# Patient Record
Sex: FEMALE | Race: WHITE | Employment: OTHER | URBAN - METROPOLITAN AREA
[De-identification: names, ages, dates, MRNs, and addresses within clinical notes are randomized per-mention and may not be internally consistent; named-entity substitution may affect disease eponyms.]

---

## 2017-07-01 PROCEDURE — 82043 UR ALBUMIN QUANTITATIVE: CPT | Performed by: FAMILY MEDICINE

## 2017-07-01 PROCEDURE — 82570 ASSAY OF URINE CREATININE: CPT | Performed by: FAMILY MEDICINE

## 2017-07-01 PROCEDURE — 82607 VITAMIN B-12: CPT | Performed by: FAMILY MEDICINE

## 2017-07-13 PROBLEM — M81.0 OSTEOPOROSIS, UNSPECIFIED OSTEOPOROSIS TYPE, UNSPECIFIED PATHOLOGICAL FRACTURE PRESENCE: Status: ACTIVE | Noted: 2017-07-13

## 2018-02-08 PROCEDURE — 82607 VITAMIN B-12: CPT | Performed by: FAMILY MEDICINE

## 2018-06-06 PROBLEM — M81.0 OSTEOPOROSIS: Status: ACTIVE | Noted: 2017-07-13

## 2018-08-14 ENCOUNTER — LAB ENCOUNTER (OUTPATIENT)
Dept: LAB | Age: 68
End: 2018-08-14
Attending: ORTHOPAEDIC SURGERY
Payer: MEDICARE

## 2018-08-14 DIAGNOSIS — M19.011 OSTEOARTHRITIS OF RIGHT SHOULDER REGION: Primary | ICD-10-CM

## 2018-08-14 PROCEDURE — 80048 BASIC METABOLIC PNL TOTAL CA: CPT

## 2018-08-14 PROCEDURE — 85025 COMPLETE CBC W/AUTO DIFF WBC: CPT

## 2018-08-14 PROCEDURE — 85610 PROTHROMBIN TIME: CPT

## 2018-08-15 LAB
ANION GAP SERPL CALC-SCNC: 7 MMOL/L (ref 0–18)
BASOPHILS # BLD AUTO: 0.03 X10(3) UL (ref 0–0.1)
BASOPHILS NFR BLD AUTO: 0.5 %
BUN BLD-MCNC: 25 MG/DL (ref 8–20)
BUN/CREAT SERPL: 28.4 (ref 10–20)
CALCIUM BLD-MCNC: 9.3 MG/DL (ref 8.3–10.3)
CHLORIDE SERPL-SCNC: 103 MMOL/L (ref 101–111)
CO2 SERPL-SCNC: 30 MMOL/L (ref 22–32)
CREAT BLD-MCNC: 0.88 MG/DL (ref 0.55–1.02)
EOSINOPHIL # BLD AUTO: 0.07 X10(3) UL (ref 0–0.3)
EOSINOPHIL NFR BLD AUTO: 1.2 %
ERYTHROCYTE [DISTWIDTH] IN BLOOD BY AUTOMATED COUNT: 12.8 % (ref 11.5–16)
GLUCOSE BLD-MCNC: 89 MG/DL (ref 70–99)
HCT VFR BLD AUTO: 41.8 % (ref 34–50)
HGB BLD-MCNC: 13.8 G/DL (ref 12–16)
IMMATURE GRANULOCYTE COUNT: 0.02 X10(3) UL (ref 0–1)
IMMATURE GRANULOCYTE RATIO %: 0.3 %
INR BLD: 0.96 (ref 0.9–1.1)
LYMPHOCYTES # BLD AUTO: 1.97 X10(3) UL (ref 0.9–4)
LYMPHOCYTES NFR BLD AUTO: 34.4 %
MCH RBC QN AUTO: 31.7 PG (ref 27–33.2)
MCHC RBC AUTO-ENTMCNC: 33 G/DL (ref 31–37)
MCV RBC AUTO: 96.1 FL (ref 81–100)
MONOCYTES # BLD AUTO: 0.5 X10(3) UL (ref 0.1–1)
MONOCYTES NFR BLD AUTO: 8.7 %
NEUTROPHIL ABS PRELIM: 3.13 X10 (3) UL (ref 1.3–6.7)
NEUTROPHILS # BLD AUTO: 3.13 X10(3) UL (ref 1.3–6.7)
NEUTROPHILS NFR BLD AUTO: 54.9 %
OSMOLALITY SERPL CALC.SUM OF ELEC: 294 MOSM/KG (ref 275–295)
PLATELET # BLD AUTO: 172 10(3)UL (ref 150–450)
POTASSIUM SERPL-SCNC: 4.8 MMOL/L (ref 3.6–5.1)
PSA SERPL DL<=0.01 NG/ML-MCNC: 13.2 SECONDS (ref 12.4–14.7)
RBC # BLD AUTO: 4.35 X10(6)UL (ref 3.8–5.1)
RED CELL DISTRIBUTION WIDTH-SD: 45.3 FL (ref 35.1–46.3)
SODIUM SERPL-SCNC: 140 MMOL/L (ref 136–144)
WBC # BLD AUTO: 5.7 X10(3) UL (ref 4–13)

## 2018-08-28 PROBLEM — R53.83 OTHER FATIGUE: Status: ACTIVE | Noted: 2018-08-28

## 2018-09-04 ENCOUNTER — APPOINTMENT (OUTPATIENT)
Dept: LAB | Age: 68
End: 2018-09-04
Attending: ORTHOPAEDIC SURGERY
Payer: MEDICARE

## 2018-09-04 DIAGNOSIS — M19.011 OSTEOARTHRITIS OF RIGHT SHOULDER REGION: ICD-10-CM

## 2018-09-04 PROCEDURE — 87086 URINE CULTURE/COLONY COUNT: CPT

## 2018-09-12 PROCEDURE — 87081 CULTURE SCREEN ONLY: CPT | Performed by: FAMILY MEDICINE

## 2018-09-12 PROCEDURE — 87086 URINE CULTURE/COLONY COUNT: CPT | Performed by: FAMILY MEDICINE

## 2018-09-12 PROCEDURE — 81001 URINALYSIS AUTO W/SCOPE: CPT | Performed by: FAMILY MEDICINE

## 2018-09-27 PROBLEM — M79.89 LIMB SWELLING: Status: ACTIVE | Noted: 2018-09-27

## 2018-10-01 PROBLEM — M80.00XG AGE-RELATED OSTEOPOROSIS WITH CURRENT PATHOLOGICAL FRACTURE WITH DELAYED HEALING, SUBSEQUENT ENCOUNTER: Status: ACTIVE | Noted: 2017-07-13

## 2018-10-03 ENCOUNTER — HOSPITAL (OUTPATIENT)
Dept: OTHER | Age: 68
End: 2018-10-03
Attending: ORTHOPAEDIC SURGERY

## 2018-10-04 LAB — 25(OH)D3+25(OH)D2 SERPL-MCNC: 49.3 NG/ML (ref 30–100)

## 2018-10-11 PROBLEM — Z98.890 S/P SHOULDER SURGERY: Status: ACTIVE | Noted: 2018-10-11

## 2018-11-13 PROCEDURE — 82523 COLLAGEN CROSSLINKS: CPT | Performed by: INTERNAL MEDICINE

## 2019-01-15 PROBLEM — M17.10 ARTHRITIS OF KNEE: Status: ACTIVE | Noted: 2019-01-15

## 2019-01-15 PROBLEM — H61.21 IMPACTED CERUMEN OF RIGHT EAR: Status: ACTIVE | Noted: 2019-01-15

## 2019-06-11 PROBLEM — H61.21 IMPACTED CERUMEN OF RIGHT EAR: Status: RESOLVED | Noted: 2019-01-15 | Resolved: 2019-06-11

## 2019-06-11 PROBLEM — M79.89 LIMB SWELLING: Status: RESOLVED | Noted: 2018-09-27 | Resolved: 2019-06-11

## 2019-10-28 ENCOUNTER — ANESTHESIA (OUTPATIENT)
Dept: ENDOSCOPY | Facility: HOSPITAL | Age: 69
End: 2019-10-28
Payer: MEDICARE

## 2019-10-28 ENCOUNTER — HOSPITAL ENCOUNTER (OUTPATIENT)
Facility: HOSPITAL | Age: 69
Setting detail: HOSPITAL OUTPATIENT SURGERY
Discharge: HOME | End: 2019-10-28
Attending: INTERNAL MEDICINE | Admitting: INTERNAL MEDICINE
Payer: MEDICARE

## 2019-10-28 ENCOUNTER — ANESTHESIA EVENT (OUTPATIENT)
Dept: ENDOSCOPY | Facility: HOSPITAL | Age: 69
End: 2019-10-28
Payer: MEDICARE

## 2019-10-28 VITALS
HEIGHT: 60 IN | HEART RATE: 58 BPM | DIASTOLIC BLOOD PRESSURE: 85 MMHG | OXYGEN SATURATION: 100 % | TEMPERATURE: 98 F | WEIGHT: 175 LBS | BODY MASS INDEX: 34.36 KG/M2 | SYSTOLIC BLOOD PRESSURE: 114 MMHG | RESPIRATION RATE: 18 BRPM

## 2019-10-28 DIAGNOSIS — R11.10 REGURGITATION OF FOOD: ICD-10-CM

## 2019-10-28 DIAGNOSIS — R05.9 COUGH: ICD-10-CM

## 2019-10-28 DIAGNOSIS — Z87.19 HISTORY OF BARRETT'S ESOPHAGUS: ICD-10-CM

## 2019-10-28 PROCEDURE — 0DB48ZX EXCISION OF ESOPHAGOGASTRIC JUNCTION, VIA NATURAL OR ARTIFICIAL OPENING ENDOSCOPIC, DIAGNOSTIC: ICD-10-PCS | Performed by: INTERNAL MEDICINE

## 2019-10-28 PROCEDURE — 88305 TISSUE EXAM BY PATHOLOGIST: CPT | Performed by: INTERNAL MEDICINE

## 2019-10-28 PROCEDURE — 0DJ68ZZ INSPECTION OF STOMACH, VIA NATURAL OR ARTIFICIAL OPENING ENDOSCOPIC: ICD-10-PCS | Performed by: INTERNAL MEDICINE

## 2019-10-28 RX ORDER — NALOXONE HYDROCHLORIDE 0.4 MG/ML
80 INJECTION, SOLUTION INTRAMUSCULAR; INTRAVENOUS; SUBCUTANEOUS AS NEEDED
Status: DISCONTINUED | OUTPATIENT
Start: 2019-10-28 | End: 2019-10-28

## 2019-10-28 RX ORDER — SODIUM CHLORIDE, SODIUM LACTATE, POTASSIUM CHLORIDE, CALCIUM CHLORIDE 600; 310; 30; 20 MG/100ML; MG/100ML; MG/100ML; MG/100ML
INJECTION, SOLUTION INTRAVENOUS CONTINUOUS
Status: DISCONTINUED | OUTPATIENT
Start: 2019-10-28 | End: 2019-10-28

## 2019-10-28 NOTE — ANESTHESIA POSTPROCEDURE EVALUATION
[address] [facility] Patient Status:  Hospital Outpatient Surgery   Age/Gender 68year old female MRN [id number]   Location [facility]. Attending Jacobo Beauchamp MD   Hosp Day # 0 PCP Regine James MD       Anesthesia Post-op Not

## 2019-10-28 NOTE — H&P
[facility] Department of  Gastroenterology  Update Health History :       Apurva Lucas  female   Shirley Mosquera MD     [id number]  4/27/1950 Primary Care Physician  Olamide Cordoba MD        HPI :    Referred in 2016 by Dr Paige Kaiser for evaluation of • Heart Disorder Father       Social History    Tobacco Use      Smoking status: Former Smoker        Packs/day: 1.00        Years: 15.00        Pack years: 13        Quit date: 1981        Years since quittin.9      Smokeless tobacco: Never U auscultation   CARDIO: RRR without murmur   GI: good BS's and no masses, HSM or tenderness   RECTAL: Exam not done. EXTREMITIES: no cyanosis,   NEURO: Oriented times three,  grossly intact         Assessment:  1. Regurgitation and cough.   Possible Rich

## 2019-10-28 NOTE — ANESTHESIA PREPROCEDURE EVALUATION
PRE-OP EVALUATION    Patient Name: Onel Armenta    Pre-op Diagnosis: History of Jackson's esophagus [Z87.19]  Regurgitation of food [R11.10]  Cough [R05]    Procedure(s):  96 HOUR BRAVO ESOPHAGOGASTRODUODENOSCOPY    Surgeon(s) and Role:     * Laura Layton Neuro/Psych      (+) depression                              Past Surgical History:   Procedure Laterality Date   • COLONOSCOPY     • ESOPHAGOGASTRODUODENOSCOPY (EGD) N/A 10/10/2016    Performed by Lennox Riggers, MD at [facility] ENDOSCOPY   • HC PV VARICOSE VEI

## 2019-10-28 NOTE — OPERATIVE REPORT
OPERATIVE REPORT   PATIENT NAME: Linda Hyman  MRN: [id number]  DATE OF OPERATION: 10/28/2019  PREOPERATIVE DIAGNOSIS:   1. History of intestinal metaplasia of the GE junction. Possible short segment Jackson's versus intestinal metaplasia of the cardia.   Sh esophagus and set at 29 cm from the incisors. Suction was applied. The probe  was then released. The deployment catheter was then removed. The upper scope was reintroduced again and the wireless probe appeared to be in good position. IMPRESSION:  1.

## 2019-10-30 NOTE — PROGRESS NOTES
10/30/2019  [address] [facility] [address] [id number]    Dear Yasmany,     Here are the biopsy/pathology findings from your recent EGD (upper endoscopy):     Biopsies of esophagus reveal esophagitis which is an inflammation of the esophagus alley

## 2019-10-30 NOTE — PROGRESS NOTES
Biopsies demonstrate Jackson's. Negative for dysplasia. Awaiting BRAVO results. Discuss with RA plans for now- restarting PPI and timing of follow up EGD    Recent EGD. 1. Esophagitis and a hiatal hernia. RECOMMENDATIONS:  1.  Given the esophagitis we w

## 2019-12-17 PROBLEM — N76.1 SUBACUTE VAGINITIS: Status: ACTIVE | Noted: 2019-12-17

## 2019-12-17 PROBLEM — B37.9 CANDIDIASIS: Status: ACTIVE | Noted: 2019-12-17

## 2019-12-17 PROBLEM — I50.1 HEART FAILURE, LEFT, WITH LVEF 41-49% (HCC): Status: ACTIVE | Noted: 2019-12-17

## 2020-05-19 PROBLEM — R05.3 CHRONIC COUGH: Status: ACTIVE | Noted: 2020-05-19

## 2020-05-19 PROBLEM — F32.1 CURRENT MODERATE EPISODE OF MAJOR DEPRESSIVE DISORDER WITHOUT PRIOR EPISODE (HCC): Status: ACTIVE | Noted: 2020-05-19

## 2020-05-19 PROBLEM — I51.89 DIASTOLIC DYSFUNCTION: Status: ACTIVE | Noted: 2020-05-19

## 2020-05-19 PROBLEM — I50.1 HEART FAILURE, LEFT, WITH LVEF 41-49% (HCC): Status: RESOLVED | Noted: 2019-12-17 | Resolved: 2020-05-19

## 2020-05-29 PROBLEM — E78.00 PURE HYPERCHOLESTEROLEMIA: Status: ACTIVE | Noted: 2020-05-29

## 2020-05-29 PROBLEM — R53.83 OTHER FATIGUE: Status: ACTIVE | Noted: 2020-05-29

## 2020-07-10 ENCOUNTER — LAB ENCOUNTER (OUTPATIENT)
Dept: LAB | Facility: HOSPITAL | Age: 70
End: 2020-07-10
Attending: INTERNAL MEDICINE
Payer: MEDICARE

## 2020-07-10 DIAGNOSIS — K21.00 GASTROESOPHAGEAL REFLUX DISEASE WITH ESOPHAGITIS: ICD-10-CM

## 2020-07-11 LAB — SARS-COV-2 RNA RESP QL NAA+PROBE: NOT DETECTED

## 2020-07-13 ENCOUNTER — ANESTHESIA EVENT (OUTPATIENT)
Dept: ENDOSCOPY | Facility: HOSPITAL | Age: 70
End: 2020-07-13
Payer: MEDICARE

## 2020-07-13 ENCOUNTER — ANESTHESIA (OUTPATIENT)
Dept: ENDOSCOPY | Facility: HOSPITAL | Age: 70
End: 2020-07-13
Payer: MEDICARE

## 2020-07-13 ENCOUNTER — HOSPITAL ENCOUNTER (OUTPATIENT)
Facility: HOSPITAL | Age: 70
Setting detail: HOSPITAL OUTPATIENT SURGERY
Discharge: HOME | End: 2020-07-13
Attending: INTERNAL MEDICINE | Admitting: INTERNAL MEDICINE
Payer: MEDICARE

## 2020-07-13 VITALS
SYSTOLIC BLOOD PRESSURE: 132 MMHG | OXYGEN SATURATION: 100 % | RESPIRATION RATE: 20 BRPM | BODY MASS INDEX: 33.38 KG/M2 | WEIGHT: 170 LBS | HEART RATE: 66 BPM | DIASTOLIC BLOOD PRESSURE: 70 MMHG | HEIGHT: 60 IN | TEMPERATURE: 99 F

## 2020-07-13 DIAGNOSIS — K21.00 GASTROESOPHAGEAL REFLUX DISEASE WITH ESOPHAGITIS: Primary | ICD-10-CM

## 2020-07-13 PROCEDURE — 88305 TISSUE EXAM BY PATHOLOGIST: CPT | Performed by: INTERNAL MEDICINE

## 2020-07-13 PROCEDURE — 0DB48ZX EXCISION OF ESOPHAGOGASTRIC JUNCTION, VIA NATURAL OR ARTIFICIAL OPENING ENDOSCOPIC, DIAGNOSTIC: ICD-10-PCS | Performed by: INTERNAL MEDICINE

## 2020-07-13 RX ORDER — NALOXONE HYDROCHLORIDE 0.4 MG/ML
80 INJECTION, SOLUTION INTRAMUSCULAR; INTRAVENOUS; SUBCUTANEOUS AS NEEDED
Status: DISCONTINUED | OUTPATIENT
Start: 2020-07-13 | End: 2020-07-13

## 2020-07-13 RX ORDER — LIDOCAINE HYDROCHLORIDE 10 MG/ML
INJECTION, SOLUTION EPIDURAL; INFILTRATION; INTRACAUDAL; PERINEURAL AS NEEDED
Status: DISCONTINUED | OUTPATIENT
Start: 2020-07-13 | End: 2020-07-13 | Stop reason: SURG

## 2020-07-13 RX ORDER — SODIUM CHLORIDE, SODIUM LACTATE, POTASSIUM CHLORIDE, CALCIUM CHLORIDE 600; 310; 30; 20 MG/100ML; MG/100ML; MG/100ML; MG/100ML
INJECTION, SOLUTION INTRAVENOUS CONTINUOUS
Status: DISCONTINUED | OUTPATIENT
Start: 2020-07-13 | End: 2020-07-13

## 2020-07-13 RX ORDER — ONDANSETRON 2 MG/ML
4 INJECTION INTRAMUSCULAR; INTRAVENOUS AS NEEDED
Status: DISCONTINUED | OUTPATIENT
Start: 2020-07-13 | End: 2020-07-13

## 2020-07-13 RX ADMIN — SODIUM CHLORIDE, SODIUM LACTATE, POTASSIUM CHLORIDE, CALCIUM CHLORIDE: 600; 310; 30; 20 INJECTION, SOLUTION INTRAVENOUS at 15:14:00

## 2020-07-13 RX ADMIN — LIDOCAINE HYDROCHLORIDE 30 MG: 10 INJECTION, SOLUTION EPIDURAL; INFILTRATION; INTRACAUDAL; PERINEURAL at 15:01:00

## 2020-07-13 RX ADMIN — SODIUM CHLORIDE, SODIUM LACTATE, POTASSIUM CHLORIDE, CALCIUM CHLORIDE: 600; 310; 30; 20 INJECTION, SOLUTION INTRAVENOUS at 14:57:00

## 2020-07-13 NOTE — ANESTHESIA POSTPROCEDURE EVALUATION
501 [facility] Patient Status:  Hospital Outpatient Surgery   Age/Gender 65year old female MRN [id number]   Location 118 [facility]. Attending Marisabel Briscoe MD   Hosp Day # 0 PCP Mazie Kayser, MD       Anesthesia Post-op Not

## 2020-07-13 NOTE — ANESTHESIA PREPROCEDURE EVALUATION
PRE-OP EVALUATION    Patient Name: Feli Chaudhari    Pre-op Diagnosis: Gastroesophageal reflux disease with esophagitis [K21.0]    Procedure(s):  ESOPHAGOGASTRODUODENOSCOPY    Surgeon(s) and Role:     Maru Perry MD - Primary    Pre-op vitals reviewed estimated ejection fraction     is 55-60%. Wall motion is normal; there are no regional wall motion     abnormalities. Doppler parameters are consistent with abnormal left     ventricular relaxation (grade 1 diastolic dysfunction).   2. Right ventricle: Est DAVIDSON Bilateral    • KNEE ARTHROSCOPY Right    • OTHER      right shoulder replacement 09/2018   • OTHER      repairright arm  broken bone/ screws elbow to shoulder 10/2018/   • OTHER SURGICAL HISTORY  05/27/2015    EGD   • TONSILLECTOMY       Social Hist

## 2020-07-13 NOTE — OPERATIVE REPORT
OPERATIVE REPORT   PATIENT NAME: Marielena Harding  MRN: [id number]  DATE OF OPERATION: 7/13/2020  PREOPERATIVE DIAGNOSIS:   1.  Questionable history of Jackson's esophagus  POSTOPERATIVE DIAGNOSES:  Slightly irregular squamocolumnar junction most likely intestina medication adverse event and missed lesions.    She was placed in the left lateral position and once an adequate level of  sedation was achieved, the lubricated tip of an Olympus video gastroscope was introduced into the mouth and the esophagus was intubate

## 2020-07-14 NOTE — PROGRESS NOTES
Review path with RA. GE junction  Biopsies reveal IM in glandular component. Negative for dysplasia. 7/13/2020 EGD with biopsies  PREOPERATIVE DIAGNOSIS:   1.  Questionable history of Jackson's esophagus  POSTOPERATIVE DIAGNOSES:  Slightly irregular

## 2020-07-15 NOTE — PROGRESS NOTES
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  This is a condition that occ

## 2020-09-22 PROBLEM — R53.83 OTHER FATIGUE: Status: RESOLVED | Noted: 2020-05-29 | Resolved: 2020-09-22

## 2020-09-22 PROBLEM — R05.3 CHRONIC COUGH: Status: RESOLVED | Noted: 2020-05-19 | Resolved: 2020-09-22

## 2020-12-08 PROBLEM — R15.1 FECAL SMEARING: Status: ACTIVE | Noted: 2020-12-08

## 2020-12-08 PROBLEM — K64.9 HEMORRHOIDS, UNSPECIFIED HEMORRHOID TYPE: Status: ACTIVE | Noted: 2020-12-08

## 2020-12-08 PROBLEM — K62.5 RECTUM BLEEDING: Status: ACTIVE | Noted: 2020-12-08

## 2021-01-02 ENCOUNTER — LAB ENCOUNTER (OUTPATIENT)
Dept: LAB | Age: 71
End: 2021-01-02
Attending: INTERNAL MEDICINE
Payer: MEDICARE

## 2021-01-02 DIAGNOSIS — K62.5 RECTAL BLEEDING: ICD-10-CM

## 2021-01-03 ENCOUNTER — ANESTHESIA EVENT (OUTPATIENT)
Dept: ENDOSCOPY | Facility: HOSPITAL | Age: 71
End: 2021-01-03
Payer: MEDICARE

## 2021-01-03 LAB — SARS-COV-2 RNA RESP QL NAA+PROBE: NOT DETECTED

## 2021-01-03 NOTE — ANESTHESIA PREPROCEDURE EVALUATION
PRE-OP EVALUATION    Patient Name: Apurva Lucas    Pre-op Diagnosis: Gastroesophageal reflux disease with esophagitis [K21.0]    Procedure(s):  ESOPHAGOGASTRODUODENOSCOPY    Surgeon(s) and Role:     Vince Loaiza MD - Primary    Pre-op vitals reviewed moderately     increased. Systolic function is normal. The estimated ejection fraction     is 55-60%. Wall motion is normal; there are no regional wall motion     abnormalities.  Doppler parameters are consistent with abnormal left     ventricular relaxatio Performed by Frederic Whatley MD at [facility] ENDOSCOPY   • ESOPHAGOGASTRODUODENOSCOPY (EGD) N/A 10/10/2016    Performed by Frederic Whatley MD at [facility] ENDOSCOPY   • [facility]. PV VARICOSE VEIN INSUFF Bilateral    • KNEE ARTHROSCOPY Right    • OTHER      right shoulder rep intraop recall, if it occurs, may be a reasonable and comfortable experience with this anesthetic. Aware that general anesthesia is not intended though deep sedation may include occasional moments of general anesthesia.   The backup plan for safe patient c

## 2021-01-04 ENCOUNTER — ANESTHESIA (OUTPATIENT)
Dept: ENDOSCOPY | Facility: HOSPITAL | Age: 71
End: 2021-01-04
Payer: MEDICARE

## 2021-01-04 ENCOUNTER — HOSPITAL ENCOUNTER (OUTPATIENT)
Facility: HOSPITAL | Age: 71
Setting detail: HOSPITAL OUTPATIENT SURGERY
Discharge: HOME | End: 2021-01-04
Attending: INTERNAL MEDICINE | Admitting: INTERNAL MEDICINE
Payer: MEDICARE

## 2021-01-04 VITALS
HEIGHT: 60 IN | HEART RATE: 79 BPM | SYSTOLIC BLOOD PRESSURE: 121 MMHG | DIASTOLIC BLOOD PRESSURE: 64 MMHG | TEMPERATURE: 97 F | WEIGHT: 174 LBS | OXYGEN SATURATION: 99 % | BODY MASS INDEX: 34.16 KG/M2 | RESPIRATION RATE: 19 BRPM

## 2021-01-04 DIAGNOSIS — K62.5 RECTAL BLEEDING: Primary | ICD-10-CM

## 2021-01-04 PROCEDURE — 0DJD8ZZ INSPECTION OF LOWER INTESTINAL TRACT, VIA NATURAL OR ARTIFICIAL OPENING ENDOSCOPIC: ICD-10-PCS | Performed by: INTERNAL MEDICINE

## 2021-01-04 RX ORDER — LIDOCAINE HYDROCHLORIDE 10 MG/ML
INJECTION, SOLUTION EPIDURAL; INFILTRATION; INTRACAUDAL; PERINEURAL AS NEEDED
Status: DISCONTINUED | OUTPATIENT
Start: 2021-01-04 | End: 2021-01-04 | Stop reason: SURG

## 2021-01-04 RX ORDER — SODIUM CHLORIDE, SODIUM LACTATE, POTASSIUM CHLORIDE, CALCIUM CHLORIDE 600; 310; 30; 20 MG/100ML; MG/100ML; MG/100ML; MG/100ML
INJECTION, SOLUTION INTRAVENOUS CONTINUOUS
Status: DISCONTINUED | OUTPATIENT
Start: 2021-01-04 | End: 2021-01-04

## 2021-01-04 RX ADMIN — SODIUM CHLORIDE, SODIUM LACTATE, POTASSIUM CHLORIDE, CALCIUM CHLORIDE: 600; 310; 30; 20 INJECTION, SOLUTION INTRAVENOUS at 14:02:00

## 2021-01-04 RX ADMIN — LIDOCAINE HYDROCHLORIDE 50 MG: 10 INJECTION, SOLUTION EPIDURAL; INFILTRATION; INTRACAUDAL; PERINEURAL at 13:45:00

## 2021-01-04 NOTE — OPERATIVE REPORT
[id number]  Trever Jimenez  4/27/1950 1/4/2021      Preoperative Diagnosis: Rectal bleeding  Postoperative Diagnosis: Hemorrhoids, diverticulosis  Procedure Performed: Colonoscopy to the cecum     Anesthesia Given:  MAC anesthesia  Colon  Preparation: Tanya Mcclelland patient was informed of the endoscopic findings and was also given a copy of the findings, postoperative instructions, and postoperative precautions.     IMPRESSION:  Hemorrhoids and diverticulosis otherwise normal colonoscopy    PLAN:      Colonoscopy in 1

## 2021-01-04 NOTE — ANESTHESIA POSTPROCEDURE EVALUATION
501 [facility] Patient Status:  Hospital Outpatient Surgery   Age/Gender 65year old female MRN [id number]   Location 118 [facility]. Attending Tirso Sales MD   Hosp Day # 0 PCP Bradley Olivares MD       Anesthesia Post-op Not

## 2021-01-05 PROBLEM — K62.5 RECTUM BLEEDING: Status: RESOLVED | Noted: 2020-12-08 | Resolved: 2021-01-05

## 2021-01-05 PROBLEM — R15.1 FECAL SMEARING: Status: RESOLVED | Noted: 2020-12-08 | Resolved: 2021-01-05

## 2021-05-26 PROBLEM — Z01.818 PRE-OP EVALUATION: Status: ACTIVE | Noted: 2021-05-26

## 2021-07-16 PROBLEM — I82.421: Status: ACTIVE | Noted: 2021-07-16

## 2021-07-28 PROBLEM — Z96.651 STATUS POST RIGHT KNEE REPLACEMENT: Status: ACTIVE | Noted: 2021-07-28

## 2021-07-28 PROBLEM — J20.9 ACUTE BRONCHITIS, UNSPECIFIED ORGANISM: Status: ACTIVE | Noted: 2021-07-28

## 2021-07-28 PROBLEM — I82.4Y1 ACUTE DEEP VEIN THROMBOSIS (DVT) OF PROXIMAL VEIN OF RIGHT LOWER EXTREMITY (HCC): Status: ACTIVE | Noted: 2021-07-16

## 2021-08-12 PROBLEM — N28.9 RENAL INSUFFICIENCY: Status: ACTIVE | Noted: 2021-08-12

## 2021-09-16 PROBLEM — J20.9 ACUTE BRONCHITIS, UNSPECIFIED ORGANISM: Status: RESOLVED | Noted: 2021-07-28 | Resolved: 2021-09-16

## 2021-09-16 PROBLEM — D64.89 ANEMIA DUE TO OTHER CAUSE, NOT CLASSIFIED: Status: ACTIVE | Noted: 2021-09-16
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
## 2021-11-03 PROBLEM — M25.531 RIGHT WRIST PAIN: Status: ACTIVE | Noted: 2021-11-03

## 2021-11-03 PROBLEM — M25.562 LEFT KNEE PAIN: Status: ACTIVE | Noted: 2021-11-03

## 2021-11-03 PROBLEM — Z20.822 EXPOSURE TO COVID-19 VIRUS: Status: ACTIVE | Noted: 2021-11-03

## 2022-01-26 PROBLEM — Z20.822 EXPOSURE TO COVID-19 VIRUS: Status: RESOLVED | Noted: 2021-11-03 | Resolved: 2022-01-26

## 2022-01-26 PROBLEM — R43.9 SMELL DISTURBANCE: Status: ACTIVE | Noted: 2022-01-26

## 2022-01-26 PROBLEM — M25.551 RIGHT HIP PAIN: Status: ACTIVE | Noted: 2022-01-26
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
## 2022-01-26 PROBLEM — Z86.16 HISTORY OF COVID-19: Status: ACTIVE | Noted: 2022-01-26

## 2022-01-26 PROBLEM — M25.562 LEFT KNEE PAIN: Status: RESOLVED | Noted: 2021-11-03 | Resolved: 2022-01-26

## (undated) DEVICE — 3M™ RED DOT™ MONITORING ELECTRODE WITH FOAM TAPE AND STICKY GEL, 50/BAG, 20/CASE, 72/PLT 2570: Brand: RED DOT™

## (undated) DEVICE — ENDOSCOPY PACK UPPER: Brand: MEDLINE INDUSTRIES, INC.

## (undated) DEVICE — CAPSULE BRAVO PH

## (undated) DEVICE — THE REVEAL DISTAL ATTACHMENT CAP IS ATTACHED TO THE DISTAL END OF THE ENDOSCOPE TO FACILITATE ENDOSCOPIC THERAPY AND IS INTENDED FOR GASTROINTESTINAL MUCOSAL RESECTION (ENDOSCOPIC MUCOSAL RESECTION) AND KEEPING THE SUITABLE DEPTH OF ENDOSCOPE'S VIEW FIELD.: Brand: REVEAL

## (undated) DEVICE — FORCEP RADIAL JAW 4

## (undated) DEVICE — 1200CC GUARDIAN II: Brand: GUARDIAN

## (undated) DEVICE — MASK ETCO2 PANORAMIC

## (undated) DEVICE — Device: Brand: DEFENDO AIR/WATER/SUCTION AND BIOPSY VALVE

## (undated) DEVICE — FILTERLINE NASAL ADULT O2/CO2

## (undated) DEVICE — CAP SEALING, REVEAL 15.0MM

## (undated) DEVICE — ENDOSCOPY PACK - LOWER: Brand: MEDLINE INDUSTRIES, INC.

## (undated) NOTE — LETTER
10/30/2019          [address] [facility] [address] [address]    Dear Brain Cooler,     Here are the biopsy/pathology findings from your recent EGD (upper endoscopy):     Biopsies of esophagus reveal esophagitis which is an inflammation of the eso